# Patient Record
Sex: FEMALE
[De-identification: names, ages, dates, MRNs, and addresses within clinical notes are randomized per-mention and may not be internally consistent; named-entity substitution may affect disease eponyms.]

---

## 2019-03-12 VITALS — SYSTOLIC BLOOD PRESSURE: 119 MMHG | DIASTOLIC BLOOD PRESSURE: 78 MMHG

## 2019-03-12 LAB
BASOPHILS NFR BLD AUTO: 0.8 % (ref 0–5)
COLOR UR: (no result)
EOSINOPHIL NFR BLD AUTO: 5.3 % (ref 0–8)
ERYTHROCYTE [DISTWIDTH] IN BLOOD BY AUTOMATED COUNT: 14.2 % (ref 11–15.5)
HCT VFR BLD AUTO: 37.3 % (ref 36–48)
LYMPHOCYTES NFR SPEC AUTO: 23.5 % (ref 21–51)
MCH RBC QN AUTO: 30.5 PG (ref 27–33)
MCHC RBC AUTO-ENTMCNC: 34.6 G/DL (ref 32–36)
MCV RBC AUTO: 88.1 FL (ref 79–99)
MONOCYTES NFR BLD AUTO: 7.5 % (ref 3–13)
NEUTROPHILS NFR BLD AUTO: 62.9 % (ref 40–77)
NRBC BLD MANUAL-RTO: 0 % (ref 0–0.19)
PH UR STRIP: 5 [PH] (ref 5–8)
PLATELET # BLD AUTO: 272 K/UL (ref 130–400)
PROT UR QL STRIP: (no result)
RBC # BLD AUTO: 4.23 MIL/UL (ref 4–5.5)
RBC #/AREA URNS HPF: >100 /HPF (ref 0–1)
SP GR UR STRIP: 1.02 (ref 1–1.03)
UROBILINOGEN UR STRIP-MCNC: 1 MG/DL (ref 0.2–1)
WBC # BLD AUTO: 8.9 K/UL (ref 4.8–10.8)
WBC #/AREA URNS HPF: (no result) /HPF (ref 0–1)

## 2019-03-14 ENCOUNTER — HOSPITAL ENCOUNTER (OUTPATIENT)
Dept: HOSPITAL 90 - DAH | Age: 49
Discharge: HOME | End: 2019-03-14
Attending: SURGERY
Payer: COMMERCIAL

## 2019-03-14 VITALS — SYSTOLIC BLOOD PRESSURE: 109 MMHG | DIASTOLIC BLOOD PRESSURE: 66 MMHG

## 2019-03-14 VITALS — SYSTOLIC BLOOD PRESSURE: 115 MMHG | DIASTOLIC BLOOD PRESSURE: 68 MMHG

## 2019-03-14 VITALS — SYSTOLIC BLOOD PRESSURE: 122 MMHG | DIASTOLIC BLOOD PRESSURE: 72 MMHG

## 2019-03-14 VITALS — SYSTOLIC BLOOD PRESSURE: 135 MMHG | DIASTOLIC BLOOD PRESSURE: 86 MMHG

## 2019-03-14 VITALS — SYSTOLIC BLOOD PRESSURE: 118 MMHG | DIASTOLIC BLOOD PRESSURE: 69 MMHG

## 2019-03-14 VITALS — SYSTOLIC BLOOD PRESSURE: 119 MMHG | DIASTOLIC BLOOD PRESSURE: 68 MMHG

## 2019-03-14 VITALS — DIASTOLIC BLOOD PRESSURE: 72 MMHG | SYSTOLIC BLOOD PRESSURE: 119 MMHG

## 2019-03-14 VITALS — SYSTOLIC BLOOD PRESSURE: 117 MMHG | DIASTOLIC BLOOD PRESSURE: 65 MMHG

## 2019-03-14 VITALS — DIASTOLIC BLOOD PRESSURE: 73 MMHG | SYSTOLIC BLOOD PRESSURE: 112 MMHG

## 2019-03-14 VITALS — WEIGHT: 208.2 LBS | BODY MASS INDEX: 35.11 KG/M2 | HEIGHT: 64.5 IN

## 2019-03-14 VITALS — DIASTOLIC BLOOD PRESSURE: 77 MMHG | SYSTOLIC BLOOD PRESSURE: 117 MMHG

## 2019-03-14 VITALS — DIASTOLIC BLOOD PRESSURE: 72 MMHG | SYSTOLIC BLOOD PRESSURE: 112 MMHG

## 2019-03-14 VITALS — DIASTOLIC BLOOD PRESSURE: 67 MMHG | SYSTOLIC BLOOD PRESSURE: 120 MMHG

## 2019-03-14 VITALS — DIASTOLIC BLOOD PRESSURE: 73 MMHG | SYSTOLIC BLOOD PRESSURE: 117 MMHG

## 2019-03-14 VITALS — DIASTOLIC BLOOD PRESSURE: 75 MMHG | SYSTOLIC BLOOD PRESSURE: 122 MMHG

## 2019-03-14 VITALS — SYSTOLIC BLOOD PRESSURE: 110 MMHG | DIASTOLIC BLOOD PRESSURE: 66 MMHG

## 2019-03-14 VITALS — SYSTOLIC BLOOD PRESSURE: 116 MMHG | DIASTOLIC BLOOD PRESSURE: 73 MMHG

## 2019-03-14 VITALS — DIASTOLIC BLOOD PRESSURE: 64 MMHG | SYSTOLIC BLOOD PRESSURE: 120 MMHG

## 2019-03-14 VITALS — SYSTOLIC BLOOD PRESSURE: 109 MMHG | DIASTOLIC BLOOD PRESSURE: 57 MMHG

## 2019-03-14 DIAGNOSIS — Z98.890: ICD-10-CM

## 2019-03-14 DIAGNOSIS — E66.9: ICD-10-CM

## 2019-03-14 DIAGNOSIS — Z82.49: ICD-10-CM

## 2019-03-14 DIAGNOSIS — K82.8: ICD-10-CM

## 2019-03-14 DIAGNOSIS — Z83.3: ICD-10-CM

## 2019-03-14 DIAGNOSIS — Z79.899: ICD-10-CM

## 2019-03-14 DIAGNOSIS — E78.5: ICD-10-CM

## 2019-03-14 DIAGNOSIS — Z98.51: ICD-10-CM

## 2019-03-14 DIAGNOSIS — Z80.9: ICD-10-CM

## 2019-03-14 DIAGNOSIS — I10: ICD-10-CM

## 2019-03-14 DIAGNOSIS — K81.1: Primary | ICD-10-CM

## 2019-03-14 PROCEDURE — 81001 URINALYSIS AUTO W/SCOPE: CPT

## 2019-03-14 PROCEDURE — 88304 TISSUE EXAM BY PATHOLOGIST: CPT

## 2019-03-14 PROCEDURE — 85025 COMPLETE CBC W/AUTO DIFF WBC: CPT

## 2019-03-14 PROCEDURE — 36415 COLL VENOUS BLD VENIPUNCTURE: CPT

## 2019-03-14 PROCEDURE — 47562 LAPAROSCOPIC CHOLECYSTECTOMY: CPT

## 2019-03-14 RX ADMIN — Medication SCH MLS/HR: at 08:05

## 2019-03-14 RX ADMIN — Medication SCH: at 08:24

## 2019-03-14 NOTE — NUR
DC

DC INSTRUCTIONS GIVEN TO PT BOYFRIEND WITH RX, INSTRUCTED TO F/U WITH DR. CUELLAR. ENCOURAGE 
PATIENT TO AMBULATE AT HOME TO RELEASE AIR AND HELP ALLEVIATE PAIN. PT SEEMED HESISTANT 
ABOUT WALKING, INFORMED ABOUT IMPORTANCE OF AMBULATING . PT VERBALIZED UNDERSTANDING.

## 2019-03-14 NOTE — NUR
NEW PT

RECEIVED PATIENT FROM PACU, S/P LAP ANEESH. BANDAIDS X 4 IN PLACE TO ABD AREA. DRY AND 
INTACT, VS STABLE ON ARRIVAL. PT AWAKE AND ALERT.